# Patient Record
Sex: FEMALE | Race: WHITE | Employment: OTHER | URBAN - METROPOLITAN AREA
[De-identification: names, ages, dates, MRNs, and addresses within clinical notes are randomized per-mention and may not be internally consistent; named-entity substitution may affect disease eponyms.]

---

## 2019-06-07 ENCOUNTER — APPOINTMENT (OUTPATIENT)
Dept: GENERAL RADIOLOGY | Facility: HOSPITAL | Age: 64
DRG: 065 | End: 2019-06-07
Attending: EMERGENCY MEDICINE
Payer: MEDICARE

## 2019-06-07 ENCOUNTER — APPOINTMENT (OUTPATIENT)
Dept: CT IMAGING | Facility: HOSPITAL | Age: 64
DRG: 065 | End: 2019-06-07
Attending: EMERGENCY MEDICINE
Payer: MEDICARE

## 2019-06-07 ENCOUNTER — HOSPITAL ENCOUNTER (INPATIENT)
Facility: HOSPITAL | Age: 64
LOS: 5 days | Discharge: NURSING, REHAB OR LONG-TERM CARE | DRG: 065 | End: 2019-06-13
Attending: EMERGENCY MEDICINE | Admitting: INTERNAL MEDICINE
Payer: MEDICARE

## 2019-06-07 DIAGNOSIS — I63.9 ACUTE CVA (CEREBROVASCULAR ACCIDENT) (HCC): ICD-10-CM

## 2019-06-07 DIAGNOSIS — R47.01 APHASIA: ICD-10-CM

## 2019-06-07 DIAGNOSIS — K92.0 HEMATEMESIS, PRESENCE OF NAUSEA NOT SPECIFIED: Primary | ICD-10-CM

## 2019-06-07 PROCEDURE — 70450 CT HEAD/BRAIN W/O DYE: CPT | Performed by: EMERGENCY MEDICINE

## 2019-06-07 PROCEDURE — 71045 X-RAY EXAM CHEST 1 VIEW: CPT | Performed by: EMERGENCY MEDICINE

## 2019-06-07 RX ORDER — ONDANSETRON 2 MG/ML
4 INJECTION INTRAMUSCULAR; INTRAVENOUS ONCE
Status: COMPLETED | OUTPATIENT
Start: 2019-06-07 | End: 2019-06-07

## 2019-06-07 RX ORDER — SERTRALINE HYDROCHLORIDE 25 MG/1
25 TABLET, FILM COATED ORAL DAILY
COMMUNITY

## 2019-06-07 RX ORDER — ONDANSETRON 2 MG/ML
INJECTION INTRAMUSCULAR; INTRAVENOUS
Status: COMPLETED
Start: 2019-06-07 | End: 2019-06-07

## 2019-06-07 RX ORDER — GABAPENTIN 400 MG/1
400 CAPSULE ORAL NIGHTLY
Status: ON HOLD | COMMUNITY
End: 2019-06-13

## 2019-06-07 RX ORDER — LEVETIRACETAM 500 MG/1
500 TABLET ORAL 2 TIMES DAILY
Status: ON HOLD | COMMUNITY
End: 2019-06-13

## 2019-06-07 RX ORDER — ATORVASTATIN CALCIUM 10 MG/1
10 TABLET, FILM COATED ORAL NIGHTLY
COMMUNITY

## 2019-06-07 RX ORDER — LISINOPRIL 2.5 MG/1
5 TABLET ORAL DAILY
Status: ON HOLD | COMMUNITY
End: 2019-06-08 | Stop reason: DRUGHIGH

## 2019-06-08 ENCOUNTER — APPOINTMENT (OUTPATIENT)
Dept: ULTRASOUND IMAGING | Facility: HOSPITAL | Age: 64
DRG: 065 | End: 2019-06-08
Attending: Other
Payer: MEDICARE

## 2019-06-08 ENCOUNTER — APPOINTMENT (OUTPATIENT)
Dept: MRI IMAGING | Facility: HOSPITAL | Age: 64
DRG: 065 | End: 2019-06-08
Attending: EMERGENCY MEDICINE
Payer: MEDICARE

## 2019-06-08 PROBLEM — R47.01 APHASIA: Status: ACTIVE | Noted: 2019-06-08

## 2019-06-08 PROBLEM — K92.0 HEMATEMESIS, PRESENCE OF NAUSEA NOT SPECIFIED: Status: ACTIVE | Noted: 2019-06-08

## 2019-06-08 PROBLEM — I63.9 ACUTE CVA (CEREBROVASCULAR ACCIDENT) (HCC): Status: ACTIVE | Noted: 2019-06-08

## 2019-06-08 PROCEDURE — 99223 1ST HOSP IP/OBS HIGH 75: CPT | Performed by: OTHER

## 2019-06-08 PROCEDURE — 93880 EXTRACRANIAL BILAT STUDY: CPT | Performed by: OTHER

## 2019-06-08 PROCEDURE — 70551 MRI BRAIN STEM W/O DYE: CPT | Performed by: EMERGENCY MEDICINE

## 2019-06-08 RX ORDER — LISINOPRIL 5 MG/1
5 TABLET ORAL DAILY
Status: DISCONTINUED | OUTPATIENT
Start: 2019-06-08 | End: 2019-06-13

## 2019-06-08 RX ORDER — OMEPRAZOLE 20 MG/1
20 CAPSULE, DELAYED RELEASE ORAL
COMMUNITY

## 2019-06-08 RX ORDER — ASPIRIN 300 MG
300 SUPPOSITORY, RECTAL RECTAL ONCE
Status: COMPLETED | OUTPATIENT
Start: 2019-06-08 | End: 2019-06-08

## 2019-06-08 RX ORDER — LEVETIRACETAM 500 MG/1
500 TABLET ORAL 2 TIMES DAILY
Status: DISCONTINUED | OUTPATIENT
Start: 2019-06-08 | End: 2019-06-08

## 2019-06-08 RX ORDER — SENNOSIDES 8.6 MG
17.2 TABLET ORAL NIGHTLY
Status: DISCONTINUED | OUTPATIENT
Start: 2019-06-08 | End: 2019-06-13

## 2019-06-08 RX ORDER — DEXTROSE, SODIUM CHLORIDE, AND POTASSIUM CHLORIDE 5; .45; .15 G/100ML; G/100ML; G/100ML
INJECTION INTRAVENOUS CONTINUOUS
Status: DISCONTINUED | OUTPATIENT
Start: 2019-06-08 | End: 2019-06-13

## 2019-06-08 RX ORDER — ATORVASTATIN CALCIUM 10 MG/1
10 TABLET, FILM COATED ORAL NIGHTLY
Status: DISCONTINUED | OUTPATIENT
Start: 2019-06-08 | End: 2019-06-09

## 2019-06-08 RX ORDER — ASPIRIN 325 MG
325 TABLET ORAL DAILY
Status: DISCONTINUED | OUTPATIENT
Start: 2019-06-09 | End: 2019-06-13

## 2019-06-08 RX ORDER — HEPARIN SODIUM 5000 [USP'U]/ML
5000 INJECTION, SOLUTION INTRAVENOUS; SUBCUTANEOUS EVERY 8 HOURS SCHEDULED
Status: DISCONTINUED | OUTPATIENT
Start: 2019-06-08 | End: 2019-06-13

## 2019-06-08 RX ORDER — ACETAMINOPHEN 325 MG/1
650 TABLET ORAL EVERY 6 HOURS PRN
Status: DISCONTINUED | OUTPATIENT
Start: 2019-06-08 | End: 2019-06-13

## 2019-06-08 RX ORDER — ASPIRIN 325 MG
325 TABLET ORAL DAILY
Status: DISCONTINUED | OUTPATIENT
Start: 2019-06-08 | End: 2019-06-08

## 2019-06-08 RX ORDER — SERTRALINE HYDROCHLORIDE 25 MG/1
25 TABLET, FILM COATED ORAL DAILY
Status: DISCONTINUED | OUTPATIENT
Start: 2019-06-08 | End: 2019-06-13

## 2019-06-08 RX ORDER — ACETAMINOPHEN 325 MG/1
325 TABLET ORAL EVERY 6 HOURS PRN
COMMUNITY

## 2019-06-08 RX ORDER — LISINOPRIL 5 MG/1
5 TABLET ORAL DAILY
COMMUNITY

## 2019-06-08 NOTE — PHYSICAL THERAPY NOTE
PHYSICAL THERAPY EVALUATION - INPATIENT     Room Number: 313/313-A  Evaluation Date: 6/8/2019  Type of Evaluation: Initial   Physician Order: PT Eval and Treat    Presenting Problem: CVA ac and chronic  Reason for Therapy: Mobility Dysfunction and Dischar related to current admission: CVA    Problem List  Principal Problem:    Hematemesis, presence of nausea not specified  Active Problems:    Hematemesis    Acute CVA (cerebrovascular accident) [facility])    Aphasia      Past Medical History  Past Medical History help from another person does the patient currently need. ..   -   Moving to and from a bed to a chair (including a wheelchair)?: Total   -   Need to walk in hospital room?: Total   -   Climbing 3-5 steps with a railing?: Total     AM-PAC Score:  Raw Score:

## 2019-06-08 NOTE — ED NOTES
MRI tech paged for patient to be scanned. MRI screening complete. Patient non verbal, but signalled to write on paper. States she was last normal at dinner and that she wants to speak in her mind but no words will come out her mouth.

## 2019-06-08 NOTE — ED NOTES
MD at bedside giving results of MRI. Dr. Corita Dakins was able to get patient to state name and say \"pen\". Other words are garbled. Patient able to communicate from writing on paper. Writing that she woke up at 0830 pm and was unable to lay in bed.  Patient began

## 2019-06-08 NOTE — ED NOTES
[address] [facility] Department of  Gastroenterology  Update Health History :       Danya Rich  female   Bonny Rosa MD     [id number]  3/24/1936 Primary Care Physician  Humberto Sheth MD        HPI :  Personal history of rectal cancer.   Patient u Yonny Da Silva arrived at bedside. Patient being transported to MRI. History    Tobacco Use      Smoking status: Never Smoker      Smokeless tobacco: Never Used    Alcohol use: No      Alcohol/week: 0.0 oz    Drug use: No       ALLERGIES:     Adhesive Tape           RASH  Latex                   RASH    Current MEDS:    No

## 2019-06-08 NOTE — ED NOTES
Orders for admission, patient is aware of plan and ready to go upstairs. Any questions, please call ED RN Carlos Look  at extension [phone].

## 2019-06-08 NOTE — ED NOTES
Patient actively vomiting in CT, no blood noted. Patient airway suctioned.  Luke Bach and Jessie Warren Rn at bedside with patient

## 2019-06-08 NOTE — PLAN OF CARE
Patient has expressive aphasia - however it is improving slowly, SLP michelle ordered - NPO until completed, left sided weakness/facial droop is chronic, left ankle contracture/left arm contracture chronic, patient unable to ambulate uses power chair.  NIH of 9 medications to optimize hemodynamic stability  - Monitor arterial and/or venous puncture sites for bleeding and/or hematoma  - Assess quality of pulses, skin color and temperature  - Assess for signs of decreased coronary artery perfusion - ex.  Angina  - E dislocation precautions)  Outcome: Progressing     Problem: NEUROLOGICAL - ADULT  Goal: Achieves stable or improved neurological status  Description  INTERVENTIONS  - Assess for and report changes in neurological status  - Initiate measures to prevent incr and engage patient/family in tolerated activity level and precautions  - Recommend patient transfer to bedside chair toward strongest side  Outcome: Progressing     Problem: Impaired Swallowing  Goal: Minimize aspiration risk  Description  Interventions:

## 2019-06-08 NOTE — CONSULTS
[facility]    Report of Consultation    Amanda Pierre Patient Status:  Inpatient    1955 MRN [id number]   Location [facility] 3W/SW Attending Behzad Grullon MD   Hosp Day # 0 PCP No primary care provider on file.      Date of Concern    None on file    Social History Narrative    None on file            Current Medications:    Current Facility-Administered Medications:  dextrose 5 % and 0.45 % NaCl with KCl 20 mEq infusion  Intravenous Continuous   Pantoprazole Sodium (PROTONIX Neurological:     Mental Status- Alert and oriented x3.   Normal attention span and concentration  Thought process intact  Memory intact- recent and remote  Mood intact  Fund of knowledge appropriate for education and age    Language intact including: c represent atelectasis versus pneumonia. Short-term followup suggested to ensure resolution. Mild interstitial edema. Preliminary report was submitted by the [facility] teleradiologist and there are no significant discrepancies.   Dictated by (CST): Flower Macdonald no major discrepancies.    Dictated by (CST): General Yon MD on 6/08/2019 at 7:23     Approved by (CST): General Yon MD on 6/08/2019 at 7:30          Ekg 12-lead    Result Date: 6/7/2019  ECG Report  Interpretation  --------------------------

## 2019-06-08 NOTE — ED NOTES
Patient actively vomiting blood in CT room on medic stretcher. Suctioning performed prior to CT scan.

## 2019-06-08 NOTE — PLAN OF CARE
Problem: Patient Centered Care  Goal: Patient preferences are identified and integrated in the patient's plan of care  Description  Interventions:  - What would you like us to know as we care for you?  I am right eye dominant, I am weak on my left side fr for edema, trend weights  Outcome: Progressing  Goal: Absence of cardiac arrhythmias or at baseline  Description  INTERVENTIONS:  - Continuous cardiac monitoring, monitor vital signs, obtain 12 lead EKG if indicated  - Evaluate effectiveness of antiarrhyth pressure  - Maintain blood pressure and fluid volume within ordered parameters to optimize cerebral perfusion and minimize risk of hemorrhage  - Monitor temperature, glucose, and sodium.  Initiate appropriate interventions as ordered  Outcome: Progressing food and liquids at a slow rate  - No straws  - Encourage small bites of food and small sips of liquid  - Offer pills one at a time, crush or deliver with applesauce as needed  - Discontinue feeding and notify MD (or speech pathologist) if coughing or pers

## 2019-06-08 NOTE — SLP NOTE
SPEECH/LANGUAGE/COGNITIVE EVALUATION - INPATIENT    Admission Date: 6/7/2019  Evaluation Date: 06/08/19    Reason for Referral: Stroke protocol    ASSESSMENT & PLAN   ASSESSMENT & IMPRESSION  Orders received, chart reviewed.  Patient with recent left CVA pe of diadochokinetic exercises with 90% accuracy within 5 sessions. In Progress   Goal #3 The patient will complete OMEs targeting Lingual, Labial and Buccal strength, ROM, and coordination with 90 % accuracy within 3 session(s).   In Progress   Goal #5 Pt

## 2019-06-08 NOTE — H&P
[facility]    History and Physical    Omar Marroquin Patient Status:  Inpatient    1955 MRN [id number]   Location [facility] 3W/SW Attending Alex Pitts MD   Hosp Day # 0 PCP No primary care provider on file.      Date:   abdominal distention. Genitourinary: Negative for dysuria, urgency and frequency. Musculoskeletal: Positive for gait problem. Skin: Negative. Neurological: Positive for facial asymmetry, speech difficulty and weakness.  Negative for dizziness and s 6/8/2019  CONCLUSION:   Retrocardiac air space density which may represent atelectasis versus pneumonia. Short-term followup suggested to ensure resolution. Mild interstitial edema.    Preliminary report was submitted by the [facility] teleradiologist and the issued by the [facility] teleradiology service. There are no major discrepancies.    Dictated by (CST): Marcelo Morgan MD on 6/08/2019 at 7:23     Approved by (CST): Marcelo Morgan MD on 6/08/2019 at 7:30          Ekg 12-lead    Result Date: 6/7/201

## 2019-06-08 NOTE — ED PROVIDER NOTES
Patient Seen in: [facility] AND [facility] Emergency Department    History   Patient presents with:  Stroke (neurologic)    Stated Complaint: stroke    HPI  History is provided by EMS.     59-year-old female with history of previous stroke with residual left-side Skin: Skin is warm. Psychiatric: She has a normal mood and affect. Nursing note and vitals reviewed. ED Course     EKG    Rate, intervals and axes as noted on EKG Report.   Rate: 104  Rhythm: Sinus Rhythm  Reading: abnormal for rate, normal f Microscopic Microscopic not indicated    RAINBOW DRAW BLUE    Collection Time: 06/07/19 11:38 PM   Result Value Ref Range    Hold Blue Auto Resulted    RAINBOW DRAW LAVENDER    Collection Time: 06/07/19 11:38 PM   Result Value Ref Range    Hold Lavender Au Brian Brown M.D. This report has been electronically signed and verified by the Radiologist whose name is printed above.     DD:  06/07/2019/DT:  06/08/2019    CHEST X-RAY, FRONTAL VIEW  6/7/2019 at 2349 hours      IMPRESSION:    Left retrocardi with patient' son over telephone - last known normal was 2 days ago - when patient was talking at that point  - on arrival to ED, pt immediately went to CT - pt had an episode of hematemesis - zofran was given/protonix ordered  - I did not see hematemesis exclusive of separately billable procedures, but in obtaining history, performing a physical exam, bedside monitoring of interventions, collecting and interpreting test, and discussion with consultants.         Disposition and Plan     Clinical Impression:

## 2019-06-08 NOTE — OCCUPATIONAL THERAPY NOTE
OCCUPATIONAL THERAPY EVALUATION - INPATIENT     Room Number: 313/313-A  Evaluation Date: 6/8/2019  Type of Evaluation: Initial  Presenting Problem: CVA, increased L sided weakness, aphasia.     Physician Order: IP Consult to Occupational Therapy  Reason for Daily Activity Short Form. Research supports that patients with this level of impairment may benefit from Rehab.      DISCHARGE RECOMMENDATIONS  OT Discharge Recommendations: Sub-acute rehabilitation vs acute rehab       PLAN  OT Treatment Plan: Balance ac L UE non-functional from prior CVA    COORDINATION  Gross Motor: WFL in R UE  Fine Motor: WFL in R UE    ACTIVITIES OF DAILY LIVING ASSESSMENT  AM-PAC ‘6-Clicks’ Inpatient Daily Activity Short Form  How much help from another person does the patient javier

## 2019-06-08 NOTE — ED NOTES
Called and spoke with Jc Mooney son of patient (POA) at [phone]. Received information from son regarding patient status.  Son states patient had previous stroke in 2015 resulting in left facial droop, left arm and leg deficit with limited movement

## 2019-06-08 NOTE — ED INITIAL ASSESSMENT (HPI)
Patient arrived via medics, stating possible stroke. Patient hx of stroke in past with left side deficit. Last know well unknown. Medics gave 2mg Narcan in field, patient was not responding verbally.

## 2019-06-09 ENCOUNTER — APPOINTMENT (OUTPATIENT)
Dept: CV DIAGNOSTICS | Facility: HOSPITAL | Age: 64
DRG: 065 | End: 2019-06-09
Attending: Other
Payer: MEDICARE

## 2019-06-09 PROCEDURE — 99232 SBSQ HOSP IP/OBS MODERATE 35: CPT | Performed by: OTHER

## 2019-06-09 PROCEDURE — 93306 TTE W/DOPPLER COMPLETE: CPT | Performed by: OTHER

## 2019-06-09 RX ORDER — PANTOPRAZOLE SODIUM 40 MG/1
40 TABLET, DELAYED RELEASE ORAL
Status: DISCONTINUED | OUTPATIENT
Start: 2019-06-10 | End: 2019-06-13

## 2019-06-09 RX ORDER — POTASSIUM CHLORIDE 14.9 MG/ML
20 INJECTION INTRAVENOUS ONCE
Status: COMPLETED | OUTPATIENT
Start: 2019-06-09 | End: 2019-06-09

## 2019-06-09 RX ORDER — ATORVASTATIN CALCIUM 20 MG/1
20 TABLET, FILM COATED ORAL NIGHTLY
Status: DISCONTINUED | OUTPATIENT
Start: 2019-06-09 | End: 2019-06-13

## 2019-06-09 RX ORDER — NYSTATIN 100000 U/G
CREAM TOPICAL 2 TIMES DAILY
Status: DISCONTINUED | OUTPATIENT
Start: 2019-06-09 | End: 2019-06-13

## 2019-06-09 NOTE — PROGRESS NOTES
[facility] Pharmacy Note: Route Optimization for Pantoprazole (PROTONIX)    Patient is currently on Pantoprazole (PROTONIX) 40 mg IV every 24 hours.    The patient meets the criteria to convert to the oral equivalent as established by the IV to Oral conversion pro

## 2019-06-09 NOTE — PLAN OF CARE
Problem: Patient Centered Care  Goal: Patient preferences are identified and integrated in the patient's plan of care  Description  Interventions:  - What would you like us to know as we care for you?  I am right eye dominant, I am weak on my left side fr for edema, trend weights  Outcome: Progressing  Goal: Absence of cardiac arrhythmias or at baseline  Description  INTERVENTIONS:  - Continuous cardiac monitoring, monitor vital signs, obtain 12 lead EKG if indicated  - Evaluate effectiveness of antiarrhyth pressure  - Maintain blood pressure and fluid volume within ordered parameters to optimize cerebral perfusion and minimize risk of hemorrhage  - Monitor temperature, glucose, and sodium.  Initiate appropriate interventions as ordered  Outcome: Progressing for all feedings (90 degrees preferred)  - Offer food and liquids at a slow rate  - No straws  - Encourage small bites of food and small sips of liquid  - Offer pills one at a time, crush or deliver with applesauce as needed  - Discontinue feeding and noti

## 2019-06-09 NOTE — PROGRESS NOTES
[facility] AND [facility]  Neurology Progress Note    Steffi Garcia Patient Status:  Inpatient    1955 MRN [id number]   Location [facility] 3W/SW Attending Claudia Ndiaye MD   Hosp Day # 1 PCP No primary care provider on file.      Subjective:  Breanna Adams Status- Alert and oriented x3.   Normal attention span and concentration  Thought process intact  Memory intact- recent and remote  Mood intact  Fund of knowledge appropriate for education and age     Language intact including: comprehension, naming, repeti or left internal carotid artery. No significant plaque formation.     Dictated by (CST): Wallace Jeans, MD on 6/08/2019 at 15:07     Approved by (CST): Wallace Jeans, MD on 6/08/2019 at 15:11          Xr Chest Ap Portable  (cpt=71045)    Result Date: 6/8/2 territory with scattered remote blood products throughout the infarcted region. 3. Senescent changes of parenchymal volume loss with sequela of chronic microvascular ischemic disease. 4. Lesser incidental findings as above.       A preliminary report was

## 2019-06-09 NOTE — PHYSICAL THERAPY NOTE
PHYSICAL THERAPY TREATMENT NOTE - INPATIENT     Room Number: 620/755-W       Presenting Problem: CVA ac and chronic    Problem List  Principal Problem:    Hematemesis, presence of nausea not specified  Active Problems:    Acute CVA (cerebrovascular acciden visit. OBJECTIVE  Precautions: Limb alert - left    WEIGHT BEARING RESTRICTION  Weight Bearing Restriction: None                PAIN ASSESSMENT   Rating: 3  Location: L ankle  Management Techniques: Activity promotion; Body mechanics;Breathing technique Goal #1 Patient is able to demonstrate  Sit  - supine - @ level: min ax1        Goal #1   Current Status  Max A    Goal #2 Patient is able to demonstrate transfers sit to stand  at assistance level:  Mod x2  with       Goal #2  Current Status Max A x 2

## 2019-06-10 PROCEDURE — 99232 SBSQ HOSP IP/OBS MODERATE 35: CPT | Performed by: OTHER

## 2019-06-10 RX ORDER — POTASSIUM CHLORIDE 20 MEQ/1
40 TABLET, EXTENDED RELEASE ORAL ONCE
Status: COMPLETED | OUTPATIENT
Start: 2019-06-10 | End: 2019-06-10

## 2019-06-10 NOTE — PROGRESS NOTES
[facility] AND [facility]  Neurology Progress Note    Sha Cameron Patient Status:  Inpatient    1955 MRN [id number]   Location [facility] 3W/SW Attending Judith Stark MD   Hosp Day # 2 PCP No primary care provider on file.      Subjective:  Dania Dress cyanosis or edema     Neurological:      Mental Status- Alert and oriented x3.   Normal attention span and concentration  Thought process intact  Memory intact- recent and remote  Mood intact  Fund of knowledge appropriate for education and age     Language is no significant area of stenosis in the right or left internal carotid artery. No significant plaque formation.     Dictated by (CST): Wallace Jeans, MD on 6/08/2019 at 15:07     Approved by (CST): Wallace Jeans, MD on 6/08/2019 at 15:11          Ekg 12-

## 2019-06-10 NOTE — PHYSICAL THERAPY NOTE
PHYSICAL THERAPY TREATMENT NOTE - INPATIENT     Room Number: 268/811-Z       Presenting Problem: CVA ac and chronic    Problem List  Principal Problem:    Hematemesis, presence of nausea not specified  Active Problems:    Acute CVA (cerebrovascular acciden left    WEIGHT BEARING RESTRICTION  Weight Bearing Restriction: None                PAIN ASSESSMENT   Rating: (did not rate)  Location: L ankle  Management Techniques: Activity promotion; Body mechanics;Repositioning    BALANCE assistance level:  Mod x2  with       Goal #2  Current Status Mod A x 1   Goal #3     Goal #3   Current Status Stand pivot from bed to chair with modx2   Goal #4 Mod A x 2   Goal #4   Current Status     Goal #5 Patient to demonstrate independence with home

## 2019-06-10 NOTE — CM/SW NOTE
Received MDO for evaluation of social support. PT/OT recommending subacute. Met with patient at bedside. She states she is from [facility]. Discussed d/c plan, she is agreeable to placement.  She requested SW discuss options with her son

## 2019-06-10 NOTE — OCCUPATIONAL THERAPY NOTE
OCCUPATIONAL THERAPY TREATMENT NOTE - INPATIENT        Room Number: 072/912-T           Presenting Problem: CVA, increased L sided weakness, aphasia.     Problem List  Principal Problem:    Hematemesis, presence of nausea not specified  Active Problems: LYNN.    DISCHARGE RECOMMENDATIONS  OT Discharge Recommendations: Sub-acute rehabilitation        PLAN  OT Treatment Plan: Balance activities; ADL training;Functional transfer training; Endurance training;Patient/Family education    SUBJECTIVE  Agreeable to a        Goals  on:  19  Frequency:  3-5 x/week

## 2019-06-10 NOTE — PROGRESS NOTES
[facility]    History and Physical    [facility] Patient Status:  Inpatient    1955 MRN [id number]   Location [facility] 3W/SW Attending Nikky Ji MD   Hosp Day # 2 PCP No primary care provider on file.      Date:   abdominal distention. Genitourinary: Negative for dysuria, urgency and frequency. Musculoskeletal: Positive for gait problem. Skin: Negative. Neurological: Positive for facial asymmetry and weakness.  Negative for dizziness, seizures and speech dif showed new area of infarction (L) MCA territory. History     Past Medical History:   Diagnosis Date   • Hyperlipidemia    • Seizure disorder ([facility] [address]    • Stroke [facility])      History reviewed. No pertinent surgical history. No family history on file.   Amilcar Figueredo %.    Nursing note and vitals reviewed. Constitutional: She is oriented to person, place, and time and obese. She appears well-developed and well-nourished. HENT:   Head: Normocephalic and atraumatic.    Eyes: Pupils are equal, round, and reactive to li MCA CVA remotely with stable deficits until new symptoms developed last night. Ischemic work up in progress.         **Certification      PHYSICIAN Certification of Need for Inpatient Hospitalization - Initial Certification    Patient will require inpatient that will reasonably be expected to span two midnight's based on the clinical documentation in H+P. Based on patients current state of illness, I anticipate that, after discharge, patient will require TBD.         Rosie Metz MD  6/10/2019

## 2019-06-10 NOTE — SLP NOTE
SPEECH DAILY NOTE - INPATIENT    ASSESSMENT & PLAN   ASSESSMENT    Swallowing Therapy  Pt seen for swallowing therapy to monitor swallowing tolerance and train swallowing precautions per BSE recommendations.   Collaborated with RN, whom reports pt is tolera word finding was observed at the conversational level. The pt was able to identify and correct word finding errors with use of speech strategies. Educated the pt on dysarthria exercises: verbal and nonverbal agility exercises.   The pt completed oral raina education. Complete family education as able.   In Progress   Goal #3 The patient will utilize compensatory strategies as outlined by  BSSE (clinical evaluation) including Slow rate, Small bites, Small sips, Alternate liquids/solids, No straws, Upright 90 syllable words with 90% accuracy within 3 sessions. Educated and trained pt on speech compensatory strategies. The pt completed sentence level material with use of strategies to 80% accuracy. Mild cues required to increase loudness.   The pt demonstr

## 2019-06-11 PROCEDURE — 99231 SBSQ HOSP IP/OBS SF/LOW 25: CPT | Performed by: OTHER

## 2019-06-11 NOTE — PROGRESS NOTES
[facility] AND [facility]  Neurology Progress Note    Ivin Solum Patient Status:  Inpatient    1955 MRN [id number]   Location [facility] 3W/SW Attending Maksim Quinn MD   Hosp Day # 3 PCP No primary care provider on file.      Subjective:  Mynor Bridges attention span and concentration  Thought process intact  Memory intact- recent and remote  Mood intact  Fund of knowledge appropriate for education and age     Language intact including: comprehension, naming, repetition, vocabulary     Cranial Nerves:  I (cerebrovascular accident) ([facility] [address]     Aphasia     Hyperlipidemia     Seizure disorder ([facility] [address]     Stroke [facility])    And with additional strokes. She will continue with antiplatelet therapy, dose of a statin will be increased since the goal for LDL is below 70.

## 2019-06-11 NOTE — CM/SW NOTE
SW left another voicemail for pt's son, Latrell Saha [phone]. SW met w/ pt, who is agreeable w/ SW contacting son, Rosendo Toure to assist w/ choosing SNF.  SW confirmed that pt is agreeable w/ going to a facility that Dr. Bautista Whyte follows [facility]

## 2019-06-11 NOTE — PLAN OF CARE
Problem: Patient Centered Care  Goal: Patient preferences are identified and integrated in the patient's plan of care  Description  Interventions:  - What would you like us to know as we care for you?  I am right eye dominant, I am weak on my left side fr for edema, trend weights  Outcome: Progressing  Goal: Absence of cardiac arrhythmias or at baseline  Description  INTERVENTIONS:  - Continuous cardiac monitoring, monitor vital signs, obtain 12 lead EKG if indicated  - Evaluate effectiveness of antiarrhyth pressure  - Maintain blood pressure and fluid volume within ordered parameters to optimize cerebral perfusion and minimize risk of hemorrhage  - Monitor temperature, glucose, and sodium.  Initiate appropriate interventions as ordered  Outcome: Progressing feedings (90 degrees preferred)  - Offer food and liquids at a slow rate  - No straws  - Encourage small bites of food and small sips of liquid  - Offer pills one at a time, crush or deliver with applesauce as needed  - Discontinue feeding and notify MD (o

## 2019-06-11 NOTE — PLAN OF CARE
Problem: Patient Centered Care  Goal: Patient preferences are identified and integrated in the patient's plan of care  Description  Interventions:  - What would you like us to know as we care for you?  I am right eye dominant, I am weak on my left side fr for edema, trend weights  Outcome: Progressing  Goal: Absence of cardiac arrhythmias or at baseline  Description  INTERVENTIONS:  - Continuous cardiac monitoring, monitor vital signs, obtain 12 lead EKG if indicated  - Evaluate effectiveness of antiarrhyth any seizure activity  - Instruct patient/family to call for assistance with activity based on assessment  Outcome: Progressing  Goal: Achieves maximal functionality and self care  Description  INTERVENTIONS  - Monitor swallowing and airway patency with pat within reach. Non skid socks on. Encouraged to call for assistance when needed. Pt calls appropriately. Frequent rounds.

## 2019-06-11 NOTE — PLAN OF CARE
Pt denies any worsening symptoms or pain tonight, plan for ISAAK today, EEG pending, will continue to monitor.

## 2019-06-11 NOTE — PLAN OF CARE
Problem: Patient Centered Care  Goal: Patient preferences are identified and integrated in the patient's plan of care  Description  Interventions:  - What would you like us to know as we care for you?  I am right eye dominant, I am weak on my left side fr control medications as ordered  - Initiate emergency measures for life threatening arrhythmias  - Monitor electrolytes and administer replacement therapy as ordered  6/11/2019 0400 by Edna Veloz RN  Outcome: Progressing  6/11/2019 0358 by Leif Ta activity  Description  INTERVENTIONS:  - Maintain airway, patient safety  and administer oxygen as ordered  - Monitor patient for seizure activity, document and report duration and description of seizure to MD/LIP  - If seizure occurs, turn patient to side applesauce as needed  - Discontinue feeding and notify MD (or speech pathologist) if coughing or persistent throat clearing or wet/gurgly vocal quality is noted  6/11/2019 0400 by Santo Salazar RN  Outcome: Progressing  6/11/2019 0358 by Torito Lipscomb

## 2019-06-11 NOTE — PROGRESS NOTES
[facility] HOSP - [facility]    History and Physical    Katya Castro Patient Status:  Inpatient    1955 MRN [id number]   Location [facility] 3W/SW Attending Micah Barton MD   Hosp Day # 3 PCP No primary care provider on file.      Date:   abdominal distention. Genitourinary: Negative for dysuria, urgency and frequency. Musculoskeletal: Positive for gait problem. Skin: Negative. Neurological: Positive for facial asymmetry, speech difficulty and weakness.  Negative for dizziness and s nausea not specified  Possible seizure with tongue biting. Pt denies any ongoing nausea. Will check CBC in AM and continue PPI and Zofran.       Acute CVA (cerebrovascular accident) [facility])  MRI evidence of acute ischemia in (L) MCA territory that may account

## 2019-06-11 NOTE — PROGRESS NOTES
[facility]    History and Physical    Warner Merlin Patient Status:  Inpatient    1955 MRN [id number]   Location [facility] 3W/SW Attending Collette Later, MD   Hosp Day # 3 PCP No primary care provider on file.      Date:   abdominal distention. Genitourinary: Negative for dysuria, urgency and frequency. Musculoskeletal: Positive for gait problem. Skin: Negative. Neurological: Positive for facial asymmetry, speech difficulty and weakness.  Negative for dizziness and s nausea not specified  Possible seizure with tongue biting. Pt denies any ongoing nausea. Will check CBC in AM and continue PPI and Zofran.       Acute CVA (cerebrovascular accident) [facility])  MRI evidence of acute ischemia in (L) MCA territory that may account

## 2019-06-12 ENCOUNTER — APPOINTMENT (OUTPATIENT)
Dept: INTERVENTIONAL RADIOLOGY/VASCULAR | Facility: HOSPITAL | Age: 64
DRG: 065 | End: 2019-06-12
Attending: HOSPITALIST
Payer: MEDICARE

## 2019-06-12 ENCOUNTER — APPOINTMENT (OUTPATIENT)
Dept: CV DIAGNOSTICS | Facility: HOSPITAL | Age: 64
DRG: 065 | End: 2019-06-12
Attending: HOSPITALIST
Payer: MEDICARE

## 2019-06-12 PROCEDURE — 93325 DOPPLER ECHO COLOR FLOW MAPG: CPT | Performed by: HOSPITALIST

## 2019-06-12 PROCEDURE — 93320 DOPPLER ECHO COMPLETE: CPT | Performed by: HOSPITALIST

## 2019-06-12 PROCEDURE — 99231 SBSQ HOSP IP/OBS SF/LOW 25: CPT | Performed by: OTHER

## 2019-06-12 RX ORDER — MIDAZOLAM HYDROCHLORIDE 1 MG/ML
2 INJECTION INTRAMUSCULAR; INTRAVENOUS EVERY 5 MIN PRN
Status: DISCONTINUED | OUTPATIENT
Start: 2019-06-12 | End: 2019-06-13

## 2019-06-12 RX ORDER — MIDAZOLAM HYDROCHLORIDE 1 MG/ML
INJECTION INTRAMUSCULAR; INTRAVENOUS
Status: COMPLETED
Start: 2019-06-12 | End: 2019-06-12

## 2019-06-12 RX ORDER — LIDOCAINE HYDROCHLORIDE 20 MG/ML
SOLUTION OROPHARYNGEAL
Status: DISPENSED
Start: 2019-06-12 | End: 2019-06-13

## 2019-06-12 RX ORDER — SODIUM CHLORIDE 9 MG/ML
INJECTION, SOLUTION INTRAVENOUS
Status: DISPENSED
Start: 2019-06-12 | End: 2019-06-13

## 2019-06-12 RX ORDER — LIDOCAINE HYDROCHLORIDE 20 MG/ML
5 SOLUTION OROPHARYNGEAL
Status: DISCONTINUED | OUTPATIENT
Start: 2019-06-12 | End: 2019-06-13

## 2019-06-12 NOTE — PROGRESS NOTES
[facility]    History and Physical    Steffi Crook Patient Status:  Inpatient    1955 MRN [id number]   Location [facility] 3W/SW Attending Claudia Ndiaye MD   Hosp Day # 4 PCP No primary care provider on file.      Date:   abdominal distention. Genitourinary: Negative for dysuria, urgency and frequency. Musculoskeletal: Positive for gait problem. Skin: Negative. Neurological: Positive for facial asymmetry, speech difficulty and weakness.  Negative for dizziness and s with tongue biting. Pt denies any ongoing nausea. Will check CBC in AM and continue PPI and Zofran. Acute CVA (cerebrovascular accident) [facility])  MRI evidence of acute ischemia in (L) MCA territory that may account for speech / language deficits.  Consid

## 2019-06-12 NOTE — PHYSICAL THERAPY NOTE
Pt was to be seen for PT treatment session. Pt is off the floor for ISAAK. Will re-attempt time-permitting when appropriate to see pt. Returned later in the afternoon after pt returned from testing. Consulted w/ RN prior to seeing pt. Visited pt in room.

## 2019-06-12 NOTE — PROGRESS NOTES
Inpatient Throughput Communication:    Called inpatient RN Lenoard Leyden and notified of scheduled procedure ISAAK on 6/12/2019. Verified that appropriate consent is signed:  Yes  Appropriate Consent Signed: No, MD needs to see patient  Access Site Hair Clipped a

## 2019-06-12 NOTE — OCCUPATIONAL THERAPY NOTE
Two attempts made this PM for skilled OT treatment. Pt away at [facility]. Pt returned to unit --RN cleared pt for activity. Upon arrival, pt reported she was feeling groggy and politely declined activity for today. Will re-attempt tomorrow.

## 2019-06-12 NOTE — CM/SW NOTE
308pm:  Per Miguelina Wong has accepted. SW updated pt's son, Cheryl Pena.    ----------------------------------------------------------------------------  204pm:   Pt's son, Cheryl Pena requested referrals to 1)[facility] and 2)[facility]. Referrals sent.

## 2019-06-12 NOTE — PROGRESS NOTES
[facility] AND [facility]  Neurology Progress Note    Leeanna Chang Patient Status:  Inpatient    1955 MRN [id number]   Location [facility] 3W/SW Attending Vern Rojo MD   Hosp Day # 4 PCP No primary care provider on file.      Subjective:  Timoteo Sung concentration  Thought process intact  Memory intact- recent and remote  Mood intact  Fund of knowledge appropriate for education and age     Language intact including: comprehension, naming, repetition, vocabulary     Cranial Nerves:  II.- Visual fields f ([facility] [address]     Aphasia     Hyperlipidemia     Seizure disorder ([facility] [address]     Stroke [facility])    And with additional strokes. She will continue with antiplatelet therapy, dose of a statin will be increased since the goal for LDL is below 70.   Echocardiogram report is

## 2019-06-12 NOTE — PROGRESS NOTES
Candace Syed  [id number]  6/12/2019    Post procedure/ recovery hand-off report given to [facility] RN. Patient's vital signs stable, .     Bc Marquez, RN

## 2019-06-12 NOTE — PLAN OF CARE
Problem: Patient Centered Care  Goal: Patient preferences are identified and integrated in the patient's plan of care  Description  Interventions:  - What would you like us to know as we care for you?  I am right eye dominant, I am weak on my left side fr for edema, trend weights  Outcome: Progressing  Goal: Absence of cardiac arrhythmias or at baseline  Description  INTERVENTIONS:  - Continuous cardiac monitoring, monitor vital signs, obtain 12 lead EKG if indicated  - Evaluate effectiveness of antiarrhyth pressure  - Maintain blood pressure and fluid volume within ordered parameters to optimize cerebral perfusion and minimize risk of hemorrhage  - Monitor temperature, glucose, and sodium.  Initiate appropriate interventions as ordered  Outcome: Progressing risk  Description  Interventions:  - Patient should be alert and upright for all feedings (90 degrees preferred)  - Offer food and liquids at a slow rate  - No straws  - Encourage small bites of food and small sips of liquid  - Offer pills one at a time, c

## 2019-06-12 NOTE — PLAN OF CARE
Problem: Patient Centered Care  Goal: Patient preferences are identified and integrated in the patient's plan of care  Description  Interventions:  - What would you like us to know as we care for you?  I am right eye dominant, I am weak on my left side fr for edema, trend weights  Outcome: Progressing  Goal: Absence of cardiac arrhythmias or at baseline  Description  INTERVENTIONS:  - Continuous cardiac monitoring, monitor vital signs, obtain 12 lead EKG if indicated  - Evaluate effectiveness of antiarrhyth pressure  - Maintain blood pressure and fluid volume within ordered parameters to optimize cerebral perfusion and minimize risk of hemorrhage  - Monitor temperature, glucose, and sodium.  Initiate appropriate interventions as ordered  Outcome: Progressing all feedings (90 degrees preferred)  - Offer food and liquids at a slow rate  - No straws  - Encourage small bites of food and small sips of liquid  - Offer pills one at a time, crush or deliver with applesauce as needed  - Discontinue feeding and notify M

## 2019-06-13 VITALS
OXYGEN SATURATION: 99 % | RESPIRATION RATE: 18 BRPM | BODY MASS INDEX: 35 KG/M2 | SYSTOLIC BLOOD PRESSURE: 118 MMHG | DIASTOLIC BLOOD PRESSURE: 74 MMHG | WEIGHT: 192.13 LBS | TEMPERATURE: 98 F

## 2019-06-13 PROCEDURE — B246ZZ4 ULTRASONOGRAPHY OF RIGHT AND LEFT HEART, TRANSESOPHAGEAL: ICD-10-PCS | Performed by: HOSPITALIST

## 2019-06-13 PROCEDURE — 99231 SBSQ HOSP IP/OBS SF/LOW 25: CPT | Performed by: OTHER

## 2019-06-13 RX ORDER — LEVETIRACETAM 750 MG/1
750 TABLET ORAL 2 TIMES DAILY
Qty: 60 TABLET | Refills: 0 | Status: SHIPPED | OUTPATIENT
Start: 2019-06-13

## 2019-06-13 RX ORDER — ASPIRIN 325 MG
325 TABLET ORAL DAILY
Qty: 100 TABLET | Refills: 0 | Status: SHIPPED | OUTPATIENT
Start: 2019-06-14

## 2019-06-13 NOTE — CM/SW NOTE
RN informed SW that pt is medically stable to discharge today and will need ambulance transport (complete).  SW spoke w/ Donavan Fraire from [facility] and [facility] stated they will be ready for pt today at 3pm.

## 2019-06-13 NOTE — PLAN OF CARE
Problem: Patient Centered Care  Goal: Patient preferences are identified and integrated in the patient's plan of care  Description  Interventions:  - What would you like us to know as we care for you?  I am right eye dominant, I am weak on my left side fr trends  - Monitor for bleeding, hypotension and signs of decreased cardiac output  - Evaluate effectiveness of vasoactive medications to optimize hemodynamic stability  - Monitor arterial and/or venous puncture sites for bleeding and/or hematoma  - Assess limb and body alignment per provider's orders  - Instruct and reinforce with patient and family use of appropriate assistive device and precautions (e.g. spinal or hip dislocation precautions)  Outcome: Adequate for Discharge     Problem: NEUROLOGICAL - AD for Discharge  6/13/2019 1038 by Gabriel Palmer RN  Outcome: Progressing     Problem: Impaired Functional Mobility  Goal: Achieve highest/safest level of mobility/gait  Description  Interventions:  - Assess patient's functional ability and stability  -

## 2019-06-13 NOTE — PLAN OF CARE
Problem: Patient Centered Care  Goal: Patient preferences are identified and integrated in the patient's plan of care  Description  Interventions:  - What would you like us to know as we care for you?  I am right eye dominant, I am weak on my left side fr level of mobility/gait  Description  Interventions:  - Assess patient's functional ability and stability  - Promote increasing activity/tolerance for mobility and gait  - Educate and engage patient/family in tolerated activity level and precautions    Outc

## 2019-06-13 NOTE — PROGRESS NOTES
[facility]    History and Physical    Charles Washington Patient Status:  Inpatient    1955 MRN [id number]   Location [facility] 3W/SW Attending Apollo Montejo MD   Hosp Day # 5 PCP No primary care provider on file.      Date:   abdominal distention. Genitourinary: Negative for dysuria, urgency and frequency. Musculoskeletal: Positive for gait problem. Skin: Negative. Neurological: Positive for facial asymmetry, speech difficulty and weakness.  Negative for dizziness and s specified  Possible seizure with tongue biting. Pt denies any ongoing nausea. Will check CBC in AM and continue PPI and Zofran.       Acute CVA (cerebrovascular accident) [facility])  MRI evidence of acute ischemia in (L) MCA territory that may account for speech

## 2019-06-13 NOTE — PROGRESS NOTES
Patient seen in follow up. Patient denies any chest pain or sob. Case discussed with Dr Kelly Sears, patient.  Time spent > 35 min   06/13/19  0827   BP: 120/75   Pulse:    Resp: 16   Temp:        Intake/Output Summary (Last 24 hours) at 6/13/2019 [address] [facility] atorvastatin 10 MG Oral Tab Take 10 mg by mouth nightly. Sertraline HCl 25 MG Oral Tab Take 25 mg by mouth daily. [DISCONTINUED] hydrocortisone 2.5 % External Cream Apply topically daily.  Apply to affected areas   [DISCONTINUED] levETIRAcetam 500 MG Or

## 2019-06-13 NOTE — PROGRESS NOTES
[facility]  Neurology Progress Note    Radha Tilley Patient Status:  Inpatient    1955 MRN [id number]   Location [facility] 3W/SW Attending Siddharth Mccormack MD   Hosp Day # 5 PCP No primary care provider on file.      Subjective:  Marcia Fix Normocephalic, atraumatic  Eyes- No redness or swelling  Neck- No masses or adenopathy  CV: pulses were palpable and normal, no cyanosis or edema     Neurological:      Mental Status- Alert and oriented x3.   Normal attention span and concentration  Thought LDL was 81.   Doppler of carotids did not show significant stenosis    Assessment:  Patient Active Problem List:     Hematemesis     Hematemesis, presence of nausea not specified     Acute CVA (cerebrovascular accident) ([facility] [address]     Aphasia     H

## 2019-06-13 NOTE — PLAN OF CARE
Patient discharged to [place] [address] facility today via [facility] due to patient's mobility status.  Patient has all of her belongings except for a lanyard that had her apartment keys; patient reported she had them at one point during hospi

## 2019-06-13 NOTE — DISCHARGE PLANNING
I called to give telephone report to nurse Claudia Manley RN at [facility]. She is aware of patient's new stroke and pertinent information about current hospitalization.  She is aware of patient's mobility status, neurological status (left facial droop

## 2019-06-25 NOTE — CONSULTS
Patient is a 71year old female who was admitted to the hospital for CVA. Consult is for ISAAK.   Past Medical History:   Past Medical History:   Diagnosis Date   • Hyperlipidemia    • Seizure disorder ([facility] [address]    • Stroke [facility])        Past Surgical History:  Hi 06/09/2019    INR 1.03 06/09/2019    PTP 13.3 06/09/2019         Imaging:  [unfilled]       Impression:     Hematemesis, presence of nausea not specified         Acute CVA (cerebrovascular accident) [facility])           Plan:  ISAAK procedure discussed, agree

## 2019-07-15 PROBLEM — Q21.1 PATENT FORAMEN OVALE: Status: ACTIVE | Noted: 2019-07-15

## 2019-07-15 PROBLEM — Q21.12 PATENT FORAMEN OVALE: Status: ACTIVE | Noted: 2019-07-15

## 2019-07-16 NOTE — DISCHARGE SUMMARY
[facility]    PATIENT'S NAME: Moise Osei   ATTENDING PHYSICIAN: Mey Zapata.  Ginny Lagos MD   PATIENT ACCOUNT#:   [id number]    LOCATION:  [address] [place] [address] RECORD #:   [id number]       YOB: 1955  ADMISSION DATE:       06/07/2019 deficits. The patient was continued on her Keppra for the possibility of a preadmission seizure with tongue biting that may account for the bloody emesis.       Speech therapy evaluation indicated no dysphagia and expressive aphasia rapidly improved to

## 2019-10-04 NOTE — SLP NOTE
ADULT SWALLOWING EVALUATION    ASSESSMENT    ASSESSMENT/OVERALL IMPRESSION:  Orders received, chart reviewed. Pt seen sitting upright in bed for BSSE. Pt alert, oriented and cooperative for PO trials.  SLP assisted with feeding of thin liquid, nectar thick specified  Active Problems:    Acute CVA (cerebrovascular accident) ([facility] [address]    Aphasia    Hyperlipidemia    [facility]    Stroke [facility])    Past Medical History  Past Medical History:   Diagnosis Date   • Hyperlipidemia    • [facility] Propulsion: Intact  Mastication: Impaired  Retention: Impaired    Pharyngeal Phase of Swallow: Impaired  Laryngeal Elevation Timing: Appears intact  Laryngeal Elevation Strength: Appears intact  Laryngeal Elevation Coordination: Appears impaired  (Please n electronic

## 2023-01-15 ENCOUNTER — APPOINTMENT (OUTPATIENT)
Dept: CT IMAGING | Facility: HOSPITAL | Age: 68
End: 2023-01-15
Attending: STUDENT IN AN ORGANIZED HEALTH CARE EDUCATION/TRAINING PROGRAM
Payer: MEDICARE

## 2023-01-15 ENCOUNTER — APPOINTMENT (OUTPATIENT)
Dept: GENERAL RADIOLOGY | Facility: HOSPITAL | Age: 68
End: 2023-01-15
Attending: STUDENT IN AN ORGANIZED HEALTH CARE EDUCATION/TRAINING PROGRAM
Payer: MEDICARE

## 2023-01-15 ENCOUNTER — HOSPITAL ENCOUNTER (EMERGENCY)
Facility: HOSPITAL | Age: 68
Discharge: HOME | End: 2023-01-15
Attending: STUDENT IN AN ORGANIZED HEALTH CARE EDUCATION/TRAINING PROGRAM
Payer: MEDICARE

## 2023-01-15 VITALS
HEART RATE: 74 BPM | BODY MASS INDEX: 31.53 KG/M2 | WEIGHT: 167 LBS | RESPIRATION RATE: 13 BRPM | TEMPERATURE: 98 F | HEIGHT: 61 IN | SYSTOLIC BLOOD PRESSURE: 108 MMHG | DIASTOLIC BLOOD PRESSURE: 65 MMHG | OXYGEN SATURATION: 97 %

## 2023-01-15 DIAGNOSIS — S10.93XA CONTUSION OF NECK, INITIAL ENCOUNTER: ICD-10-CM

## 2023-01-15 DIAGNOSIS — S30.0XXA CONTUSION OF COCCYX, INITIAL ENCOUNTER: Primary | ICD-10-CM

## 2023-01-15 PROCEDURE — 99284 EMERGENCY DEPT VISIT MOD MDM: CPT

## 2023-01-15 PROCEDURE — 73502 X-RAY EXAM HIP UNI 2-3 VIEWS: CPT | Performed by: STUDENT IN AN ORGANIZED HEALTH CARE EDUCATION/TRAINING PROGRAM

## 2023-01-15 PROCEDURE — 70450 CT HEAD/BRAIN W/O DYE: CPT | Performed by: STUDENT IN AN ORGANIZED HEALTH CARE EDUCATION/TRAINING PROGRAM

## 2023-01-15 PROCEDURE — 72125 CT NECK SPINE W/O DYE: CPT | Performed by: STUDENT IN AN ORGANIZED HEALTH CARE EDUCATION/TRAINING PROGRAM

## 2023-01-15 PROCEDURE — 72220 X-RAY EXAM SACRUM TAILBONE: CPT | Performed by: STUDENT IN AN ORGANIZED HEALTH CARE EDUCATION/TRAINING PROGRAM

## 2023-01-15 RX ORDER — CLOPIDOGREL BISULFATE 75 MG/1
75 TABLET ORAL DAILY
COMMUNITY

## 2023-01-15 NOTE — ED INITIAL ASSESSMENT (HPI)
Patient arrives via Ambulance. Patient had slid out of her wheelchair and estimates she was down on the ground for about 5 hours. Patient is on plavix.

## 2023-01-15 NOTE — ED QUICK NOTES
Assisting primary rn. Patient's son called stating he would like to take patient back to living facility himself.  Eta 30mins

## 2023-10-30 ENCOUNTER — APPOINTMENT (OUTPATIENT)
Dept: GENERAL RADIOLOGY | Facility: HOSPITAL | Age: 68
End: 2023-10-30
Attending: EMERGENCY MEDICINE
Payer: MEDICARE

## 2023-10-30 ENCOUNTER — HOSPITAL ENCOUNTER (EMERGENCY)
Facility: HOSPITAL | Age: 68
Discharge: HOME | End: 2023-10-30
Attending: EMERGENCY MEDICINE
Payer: MEDICARE

## 2023-10-30 VITALS
DIASTOLIC BLOOD PRESSURE: 56 MMHG | RESPIRATION RATE: 18 BRPM | WEIGHT: 156 LBS | SYSTOLIC BLOOD PRESSURE: 101 MMHG | TEMPERATURE: 98 F | HEART RATE: 70 BPM | OXYGEN SATURATION: 96 % | BODY MASS INDEX: 29 KG/M2

## 2023-10-30 DIAGNOSIS — S83.92XA SPRAIN OF LEFT KNEE, UNSPECIFIED LIGAMENT, INITIAL ENCOUNTER: Primary | ICD-10-CM

## 2023-10-30 PROCEDURE — 99283 EMERGENCY DEPT VISIT LOW MDM: CPT

## 2023-10-30 PROCEDURE — 73560 X-RAY EXAM OF KNEE 1 OR 2: CPT | Performed by: EMERGENCY MEDICINE

## 2023-10-30 PROCEDURE — 99284 EMERGENCY DEPT VISIT MOD MDM: CPT

## 2023-10-30 NOTE — ED INITIAL ASSESSMENT (HPI)
Thursday she was transferring to a chair and now having left knee pain. Denies falling. States she felt a \"pop\" when she twisted her knee. Has been taking ibuprofen for the pain. She is wheelchair bound. Redness and swelling noted to left knee.

## 2023-11-06 ENCOUNTER — APPOINTMENT (OUTPATIENT)
Dept: GENERAL RADIOLOGY | Facility: HOSPITAL | Age: 68
End: 2023-11-06
Attending: EMERGENCY MEDICINE
Payer: MEDICARE

## 2023-11-06 ENCOUNTER — HOSPITAL ENCOUNTER (EMERGENCY)
Facility: HOSPITAL | Age: 68
Discharge: HOME | End: 2023-11-06
Attending: EMERGENCY MEDICINE
Payer: MEDICARE

## 2023-11-06 VITALS
BODY MASS INDEX: 29 KG/M2 | OXYGEN SATURATION: 96 % | RESPIRATION RATE: 20 BRPM | HEART RATE: 76 BPM | SYSTOLIC BLOOD PRESSURE: 111 MMHG | TEMPERATURE: 98 F | DIASTOLIC BLOOD PRESSURE: 68 MMHG | WEIGHT: 156 LBS

## 2023-11-06 DIAGNOSIS — M25.562 LEFT KNEE PAIN, UNSPECIFIED CHRONICITY: Primary | ICD-10-CM

## 2023-11-06 PROCEDURE — 99284 EMERGENCY DEPT VISIT MOD MDM: CPT

## 2023-11-06 PROCEDURE — 73560 X-RAY EXAM OF KNEE 1 OR 2: CPT | Performed by: EMERGENCY MEDICINE

## 2023-11-06 RX ORDER — NAPROXEN 500 MG/1
500 TABLET ORAL 2 TIMES DAILY PRN
Qty: 14 TABLET | Refills: 0 | Status: SHIPPED | OUTPATIENT
Start: 2023-11-06 | End: 2023-11-13

## 2023-11-06 NOTE — ED INITIAL ASSESSMENT (HPI)
Patient arrives via ems from [place] for left sided knee pain x 10 days ago after twisting injury in which patient felt a pop. Today unable to ambulate due to pain, denies new injury.

## (undated) NOTE — IP AVS SNAPSHOT
[facility]            (For Outpatient Use Only) Initial Admit Date: 6/7/2019   Inpt/Obs Admit Date: Inpt: 6/8/19 / Obs: N/A   Discharge Date:    Nikhil Greenberg:  [de-identified]   MRN: [de-identified]   CSN: [id number]   CEID: [id number] Hospital Account Financial Class: Medicare    June 13, 2019

## (undated) NOTE — IP AVS SNAPSHOT
Patient Demographics     Address  [address] [place] [address] Phone  [phone] [facility]  [phone] [facility] E-mail Address  [email or web address]      Emergency Contact(s)     Name Relation Home Work Eben[place]iler Son   [phone] Take 1 tablet (17.2 mg total) by mouth nightly. Shonda Shay MD         Sertraline HCl 25 MG Tabs  Commonly known as:  ZOLOFT  Next dose due: Tomorrow, 6/14      Take 25 mg by mouth daily.                 Where to Get Your Medications      Please pick Resp  18 Filed at 06/13/2019 1504   Temp  98.2 °F (36.8 °C) Filed at 06/13/2019 0827   SpO2  99 % Filed at 06/13/2019 1503      Patient's Most Recent Weight       Most Recent Value   Patient Weight  87.1 kg (192 lb 1.6 oz)      Lab Results Last 24 Hours hydrocortisone 2.5 % External Cream Apply topically daily. Apply to affected areas   atorvastatin 10 MG Oral Tab Take 10 mg by mouth nightly. Sertraline HCl 25 MG Oral Tab Take 25 mg by mouth daily.    levETIRAcetam 500 MG Oral Tab Take 500 mg by mouth 2 displays abnormal reflex. Dense (L) hemiplegia. Speech is garbled but more intelligable than what was documented in ED. Hyperreflexic on (L). Skin: Skin is warm and dry. Psychiatric: She has a normal mood and affect.      Cervical Papanicolaou contrai on 6/08/2019 at 6:38          Mri Brain Wo Acute (3) Sequence (cpt=70551)    Result Date: 6/8/2019  CONCLUSION:  Limited 3-sequence stroke protocol study was performed. Within these parameters: 1.  Acute/subacute ischemia in the left frontal and left fronto discharge, patient will require TBD. Donna Roberson MD  6/8/2019[JH.1]    Electronically signed by Collette Later, MD on 6/8/2019 12:24 PM   [facility]. 1 - Collette Later, MD on 6/8/2019 11:58 AM                        Consults - MD Consult also the fact that she had evidence of intracranial hemorrhages in the past.  On review of the MRI it is obvious the patient had a very large right MCA stroke in the past with most likely following hemorrhagic transformation since there are some blood prod Sulfa Antibiotics       RASH    Review of Systems:   As in HPI, the rest of the 14 system review was done and was negative    Physical Exam:      06/08/19  0327 06/08/19  0500 06/08/19  0801 06/08/19  1054   BP: 127/84  (!) 89/53 106/76   Pulse: 97  94 Upgoing toe on the left    Coordination:  Finger to nose intact on the right and unable to the on the left  Rapid alternating movements unable to the on the left    Gait:  Unable to test          Results:     Laboratory Data:  Lab Results   Component Value Mri Brain Wo Acute (3) Sequence (cpt=70551)    Result Date: 6/8/2019  CONCLUSION:  Limited 3-sequence stroke protocol study was performed. Within these parameters: 1.  Acute/subacute ischemia in the left frontal and left frontoparietal regions, in the vascu Electronically signed by Koko Izquierdo MD on 6/8/2019 12:21 PM   Attribution Key    AK. 1 - Koko Izquierdo MD on 6/8/2019 12:16 PM                     D/C Summary    No notes of this type exist for this encounter.         Physical Therapy Note Physical Therapy Note signed by Keaton Arboleda PT at 6/10/2019  4:25 PM  Version 1 of 1    Author:  Keaton Arboleda PT Service:  Rehab Author Type:  Physical Therapist    Filed:  6/10/2019  4:25 PM Date of Service:  6/10/2019  3:24 PM Status:  Sign conservation;Patient education; Family education;Strengthening;Transfer training;Balance training(daynamic balance training sitting standing)    SUBJECTIVE  Pt usually transfers to her weak L side. Original CVA occurred in 2014.  She moved to [address] [address] 2 mo aware of session/findings; All patient questions and concerns addressed; Alarm set    CURRENT GOALS   Goals to be met by: 6/20/19  Patient Goal Patient's self-stated goal is: get better   Goal #1 Patient is able to demonstrate  Sit  - supine - @ level: min a :  Svetlana Gallardo OT (Occupational Therapist)    Related Notes:  Original Note by Svetlana Gallardo OT (Occupational Therapist) filed at 6/10/2019  4:02 PM       OCCUPATIONAL THERAPY TREATMENT NOTE - INPATIENT        Room Number: 36 The patient's[KH.1] Approx Degree of Impairment: 59.67%[KH.2] has been calculated based on documentation in the [facility] '6 clicks' Inpatient Daily Activity Short Form. Research supports that patients with this level of impairment may benefit from LYNN.     DI Patients self stated goal is: to get better.       Patient will complete functional transfer with SBA  Comment: Mod A sit>stand ;  Mod A X 2 for transfer     Patient will complete toileting with SBA  Comment: NT     Patient will complete UE dressing with mi functional. Pt reported overall, though, she feels more weak. Pt benefited from additional time, verbal cues, and RW to maximize participation. Pt instructed on plan of care, goal of therapy. Pt reported she feels weak.  Pt completed bed mobility with Mo -   Bathing (including washing, rinsing, drying)?: A Lot  -   Toileting, which includes using toilet, bedpan or urinal? : A Lot  -   Putting on and taking off regular upper body clothing?: A Lot  -   Taking care of personal grooming such as brushing teeth? :  Vidhya Owens SLP (SPEECH-LANGUAGE PATHOLOGIST)       SPEECH DAILY NOTE - INPATIENT    ASSESSMENT & PLAN   ASSESSMENT    Swallowing Therapy  Pt seen for swallowing therapy to monitor swallowing tolerance and train swallowing precautions per B The pt stated, \"I have to stop and think about the word before I say it. \"  Minimal to mild hesitations with word finding was observed at the conversational level. The pt was able to identify and correct word finding errors with use of speech strategies. The pt educated on swallowing precautions. The pt acknowledged understanding of education. Family was not present at time of therapy for education. Complete family education as able.   In Progress   Goal #3 The patient will utilize compensatory strategie Goal #5 Pt to use trained speech compensatory strategies (slow rate, exaggerated articulation, pausing between words) during repetition of 3-4 syllable words with 90% accuracy within 3 sessions.       Educated and trained pt on speech compensatory strategie